# Patient Record
Sex: MALE | Race: WHITE | NOT HISPANIC OR LATINO | Employment: OTHER | URBAN - METROPOLITAN AREA
[De-identification: names, ages, dates, MRNs, and addresses within clinical notes are randomized per-mention and may not be internally consistent; named-entity substitution may affect disease eponyms.]

---

## 2021-07-20 ENCOUNTER — TELEPHONE (OUTPATIENT)
Dept: INTERNAL MEDICINE | Facility: CLINIC | Age: 66
End: 2021-07-20

## 2021-07-20 ENCOUNTER — OFFICE VISIT (OUTPATIENT)
Dept: INTERNAL MEDICINE | Facility: CLINIC | Age: 66
End: 2021-07-20

## 2021-07-20 VITALS
BODY MASS INDEX: 21.02 KG/M2 | HEIGHT: 70 IN | TEMPERATURE: 97.7 F | DIASTOLIC BLOOD PRESSURE: 72 MMHG | HEART RATE: 61 BPM | SYSTOLIC BLOOD PRESSURE: 120 MMHG | OXYGEN SATURATION: 98 % | WEIGHT: 146.8 LBS

## 2021-07-20 DIAGNOSIS — N50.89 TESTICULAR MASS: Primary | ICD-10-CM

## 2021-07-20 DIAGNOSIS — R97.20 ELEVATED PSA: ICD-10-CM

## 2021-07-20 DIAGNOSIS — Z12.11 ENCOUNTER FOR SCREENING FOR COLORECTAL MALIGNANT NEOPLASM: ICD-10-CM

## 2021-07-20 DIAGNOSIS — Z12.12 ENCOUNTER FOR SCREENING FOR COLORECTAL MALIGNANT NEOPLASM: ICD-10-CM

## 2021-07-20 DIAGNOSIS — F55.1 ABUSE OF HERBAL MEDICINE: ICD-10-CM

## 2021-07-20 PROBLEM — Z80.42 FAMILY HISTORY OF PROSTATE CANCER: Status: ACTIVE | Noted: 2021-07-20

## 2021-07-20 PROCEDURE — 99204 OFFICE O/P NEW MOD 45 MIN: CPT | Performed by: FAMILY MEDICINE

## 2021-07-20 NOTE — PROGRESS NOTES
Date of Encounter: 2021  Patient: Esdras Mares,  1955    Subjective   History of Presenting Illness  Chief complaint: Testicular enlargement    Approximately 6-month history of right testicular enlargement, nonpainful, with no other associated symptoms.  He has never had this happen before.  Occasionally has burning at the very end of his urinary stream but otherwise denies penile discharge, hematuria, abdominal pain, or change in urinary flow.  He does have nocturia.    Family history of prostate cancer in brother, maternal grandfather, with personal history of elevated PSA previously although he cannot give any details.    Currently does not have health insurance and is self-pay, he would like to hold on labs for now    Lives with female partner.  1 child.  Prior 7-pack-year smoker quit at age 31.  Does not drink alcohol.  Exercises at the gym almost daily.  Very healthy diet with vegetables, fruits, lean meats.  He also takes several herbal supplements for his health and prefers a more naturalistic approach to medicine.  He has never had colon cancer screening.  Currently not employed.  He declines vaccinations due to personal belief about naturopathic alternatives to immune protection.  He has no personal history of COVID-19 illness.  His bubble remains fairly well vaccinated and he wears a mask during exposures.  He does not have a living will.    Review of Systems:  Negative for fever, congestion, chest pain upon exertion, shortness of breath, vision changes, vomiting, dysuria, lymphadenopathy, muscle weakness, numbness, mood changes, rashes.    The following portions of the patient's history were reviewed and updated as appropriate: allergies, current medications, past family history, past medical history, past social history, past surgical history and problem list.    Patient Active Problem List   Diagnosis   • Testicular mass   • Elevated PSA   • Herbal medicine   • Family history of  "prostate cancer     No past medical history on file.  Past Surgical History:   Procedure Laterality Date   • RHINOPLASTY       No family history on file.  No current outpatient medications on file.  No Known Allergies  Social History     Tobacco Use   • Smoking status: Former Smoker   • Smokeless tobacco: Never Used   • Tobacco comment: QUIT 15 YEARS AGO   Substance Use Topics   • Alcohol use: No   • Drug use: Not on file          Objective   Physical Exam  Vitals:    07/20/21 1330   BP: 120/72   Pulse: 61   Temp: 97.7 °F (36.5 °C)   TempSrc: Temporal   SpO2: 98%   Weight: 66.6 kg (146 lb 12.8 oz)   Height: 177.8 cm (70\")     Body mass index is 21.06 kg/m².    Constitutional: NAD.  Eyes: EOMI. PERRLA. Normal conjunctiva.  Cardiovascular: RRR. No murmurs. No LE edema b/l. Radial pulses 2+ bilaterally.  Pulmonary: CTA b/l. Good effort.  Integumentary: No rashes or wounds on face or upper extremities.  Lymphatic: No anterior cervical lymphadenopathy.  Endocrine: No thyromegaly or palpable thyroid nodules.  Psychiatric: Normal affect. Normal thought content.  Genitourinary: Significantly enlarged right testicle with what feels like a mass, approximately 4 to 5 cm on the inferior pole, relatively soft and nontender.  No palpable inguinal hernias on either side.  There is no overlying scrotal change.  Gastrointestinal: Nondistended. No hepatosplenomegaly. No focal tenderness to palpation. Normal bowel sounds.     Assessment/Plan   Assessment and Plan  Pleasant 65-year-old male with right testicular mass, history of elevated PSA with family history of prostate cancer, who presents with the following:    Diagnoses and all orders for this visit:    1. Testicular mass (Primary): Urgent ultrasound and urology referral due to size and risk of malignancy.  -     US testicular or ovarian vascular limited; Future  -     US scrotum and testicles; Future    2. Elevated PSA: Recommend PSA due to personal and family history    3. " Encounter for screening for colorectal malignant neoplasm: Patient declines colonoscopy but amenable to Cologuard  -     Cologuard - Stool, Per Rectum; Future    4. Herbal medicine: Recommend patient bring in supplements for review in the future    He declines vaccinations today.    Due to self-pay status, he also declines labs today even though he understands risk of unchecked PSA.  Fortunately he will be getting Medicare in October and will have better coverage for diagnostic work.    Recommend he return to office in 3 to 4 months for annual physical    Daniel Fuller MD  Family Medicine  O: [phone]  C: [phone]    Disclaimer: Parts of this note were dictated by speech recognition. Minor errors in transcription may be present. Please call if questions.

## 2021-07-20 NOTE — TELEPHONE ENCOUNTER
Caller: Esdras Mares    Relationship to patient: Self    Patient is needing: PATIENT IS CALLING STATING THAT DR ALVAREZ WANTED TO KNOW WHAT TYPE OF CANCER HIS MOTHER HAD AND PATIENT BELIEVES IT TO BE RECTAL CANCER.

## 2021-07-22 ENCOUNTER — PATIENT ROUNDING (BHMG ONLY) (OUTPATIENT)
Dept: INTERNAL MEDICINE | Facility: CLINIC | Age: 66
End: 2021-07-22

## 2021-07-22 NOTE — PROGRESS NOTES
July 22, 2021    Hello, may I speak with Esdras Mares?    My name is CHAVEZ      I am  with [facility]  [address] [place] [address] [phone].    Before we get started may I verify your date of birth? 1955    I am calling to officially welcome you to our practice and ask about your recent visit. Is this a good time to talk? NO - VOICEMAIL LEFT    Tell me about your visit with us. What things went well?  N/A       We're always looking for ways to make our patients' experiences even better. Do you have recommendations on ways we may improve?  N/A    Overall were you satisfied with your first visit to our practice? N/A       I appreciate you taking the time to speak with me today. Is there anything else I can do for you? N/A      Thank you, and have a great day.

## 2021-07-23 ENCOUNTER — TELEPHONE (OUTPATIENT)
Dept: INTERNAL MEDICINE | Facility: CLINIC | Age: 66
End: 2021-07-23

## 2021-07-29 ENCOUNTER — APPOINTMENT (OUTPATIENT)
Dept: ULTRASOUND IMAGING | Facility: HOSPITAL | Age: 66
End: 2021-07-29

## 2021-08-03 ENCOUNTER — HOSPITAL ENCOUNTER (OUTPATIENT)
Dept: ULTRASOUND IMAGING | Facility: HOSPITAL | Age: 66
Discharge: HOME | End: 2021-08-03
Admitting: FAMILY MEDICINE

## 2021-08-03 DIAGNOSIS — N50.89 TESTICULAR MASS: ICD-10-CM

## 2021-08-03 PROCEDURE — 76870 US EXAM SCROTUM: CPT

## 2021-11-02 ENCOUNTER — CLINICAL SUPPORT (OUTPATIENT)
Dept: INTERNAL MEDICINE | Facility: CLINIC | Age: 66
End: 2021-11-02

## 2021-11-02 DIAGNOSIS — Z20.822 ENCOUNTER FOR LABORATORY TESTING FOR COVID-19 VIRUS: ICD-10-CM

## 2021-11-02 DIAGNOSIS — R05.9 COUGH: Primary | ICD-10-CM

## 2021-11-02 PROCEDURE — 99211 OFF/OP EST MAY X REQ PHY/QHP: CPT | Performed by: FAMILY MEDICINE

## 2021-11-03 LAB
LABCORP SARS-COV-2, NAA 2 DAY TAT: NORMAL
SARS-COV-2 RNA RESP QL NAA+PROBE: NOT DETECTED

## 2021-11-05 ENCOUNTER — TELEPHONE (OUTPATIENT)
Dept: INTERNAL MEDICINE | Facility: CLINIC | Age: 66
End: 2021-11-05

## 2021-11-12 ENCOUNTER — TELEPHONE (OUTPATIENT)
Dept: INTERNAL MEDICINE | Facility: CLINIC | Age: 66
End: 2021-11-12

## 2021-11-12 NOTE — TELEPHONE ENCOUNTER
Caller: Esdras Mares    Relationship: Self    Best call back number: [phone] (H)    What is the best time to reach you: ANY TIME     Who are you requesting to speak with (clinical staff, provider,  specific staff member):  CLINICAL STAFF     What was the call regarding: PATIENT CAME TO OUR OFFICE 11/02/21 AND WAS COVID TESTED- HE JUST RECEIVED A BILL FOR 92$ AND HE IS CURIOUS AS TO WHY- HE WAS UNDER THE IMPRESSION THE COVID TEST WOULD BE FREE.      I GOT PATIENT TRANSFERRED OVER TO CENTRAL BILLING AS WELL BUT HE WOULD LIKE A CALL BACK FROM OUR OFFICE PLEASE TO DISCUSS THIS.    PLEASE CALL AND ADVISE.     Do you require a callback: YES

## 2021-11-16 NOTE — TELEPHONE ENCOUNTER
Called patient and advised there was a charge on his account that was incorrect and has now been removed.  He would still need to call insurance to see how they are processing it on their end.  Pt expressed understanding.

## 2022-07-13 ENCOUNTER — TELEPHONE (OUTPATIENT)
Dept: INTERNAL MEDICINE | Facility: CLINIC | Age: 67
End: 2022-07-13